# Patient Record
Sex: MALE | Race: BLACK OR AFRICAN AMERICAN | NOT HISPANIC OR LATINO
[De-identification: names, ages, dates, MRNs, and addresses within clinical notes are randomized per-mention and may not be internally consistent; named-entity substitution may affect disease eponyms.]

---

## 2022-09-21 PROBLEM — Z00.00 ENCOUNTER FOR PREVENTIVE HEALTH EXAMINATION: Status: ACTIVE | Noted: 2022-09-21

## 2022-09-24 ENCOUNTER — APPOINTMENT (OUTPATIENT)
Dept: ORTHOPEDIC SURGERY | Facility: CLINIC | Age: 62
End: 2022-09-24

## 2022-09-24 DIAGNOSIS — M17.11 UNILATERAL PRIMARY OSTEOARTHRITIS, RIGHT KNEE: ICD-10-CM

## 2022-09-24 DIAGNOSIS — M25.561 PAIN IN RIGHT KNEE: ICD-10-CM

## 2022-09-24 PROCEDURE — 20610 DRAIN/INJ JOINT/BURSA W/O US: CPT | Mod: RT

## 2022-09-24 PROCEDURE — 73564 X-RAY EXAM KNEE 4 OR MORE: CPT | Mod: RT

## 2022-09-24 PROCEDURE — 99203 OFFICE O/P NEW LOW 30 MIN: CPT | Mod: 25

## 2022-09-24 RX ORDER — DAPAGLIFLOZIN 10 MG/1
10 TABLET, FILM COATED ORAL
Refills: 0 | Status: ACTIVE | COMMUNITY

## 2022-09-24 RX ORDER — AMLODIPINE BESYLATE 10 MG/1
10 TABLET ORAL
Refills: 0 | Status: ACTIVE | COMMUNITY

## 2022-09-24 RX ORDER — LOSARTAN POTASSIUM 100 MG/1
100 TABLET, FILM COATED ORAL
Refills: 0 | Status: ACTIVE | COMMUNITY

## 2022-09-24 RX ORDER — APIXABAN 5 MG/1
TABLET, FILM COATED ORAL
Refills: 0 | Status: ACTIVE | COMMUNITY

## 2022-09-24 RX ORDER — CHLORTHALIDONE 50 MG/1
TABLET ORAL
Refills: 0 | Status: ACTIVE | COMMUNITY

## 2022-09-24 RX ORDER — CALCITRIOL 0.25 UG/1
0.25 CAPSULE, LIQUID FILLED ORAL
Refills: 0 | Status: ACTIVE | COMMUNITY

## 2022-09-24 RX ORDER — COLCHICINE 0.6 MG/1
CAPSULE ORAL
Refills: 0 | Status: ACTIVE | COMMUNITY

## 2022-09-24 RX ORDER — DICLOFENAC SODIUM 1% 10 MG/G
1 GEL TOPICAL
Qty: 1 | Refills: 0 | Status: ACTIVE | COMMUNITY
Start: 2022-09-24 | End: 1900-01-01

## 2022-09-24 RX ORDER — LABETALOL HYDROCHLORIDE 300 MG/1
TABLET, FILM COATED ORAL
Refills: 0 | Status: ACTIVE | COMMUNITY

## 2022-09-24 RX ORDER — ALLOPURINOL 200 MG/1
TABLET ORAL
Refills: 0 | Status: ACTIVE | COMMUNITY

## 2022-09-24 NOTE — HISTORY OF PRESENT ILLNESS
[de-identified] : Patient presents today for initial evaluation of right knee pain.  The patient reports of having pain for the better part of the past 10 months.  The pain started last November during a birthday party during which she was dancing.  At that time he felt a snapping sensation.  He reports of intermittent pain of the anterior knee.  He does have pain after sitting for an extended period time and going to stand.  He reports of mild buckling at times.  He is unable to take anti-inflammatories because of past gastric bypass surgery.  He does occasionally take Tylenol.  He does not use any bracing.  No cane or walker.  No past knee surgery.\par \par Review of Systems-\par Constitutional: No fever or chills. \par Cardiovascular: No orthopnea or chest pain\par Pulmonary: No shortness of breath. \par GI: No nausea or vomiting or abdominal pain.\par Musculoskeletal: see HPI \par Psychiatric: No anxiety and depression.

## 2022-09-24 NOTE — PHYSICAL EXAM
[de-identified] : The patient is conversive and in no apparent distress. The patient is alert and oriented to person, place, and time. Affect and mood appear normal. The head is normocephalic and atraumatic. Skin shows normal turgor with no evidence of eczema or psoriasis. No respiratory distress noted. Sensation grossly intact. MUSCULOSKELETAL:   SEE BELOW\par \par Right knee exam demonstrates skin that is clean, dry and intact.  No surgical scars.  No signs of acute trauma.  Minimal effusion.  Normal temperature.  Normal alignment of the knee.  Passive range of motion is 0 to 105 degrees of flexion which is limited by body habitus.  Minimal laxity with stress testing.  No extensor mechanism lag.  There is specific medial joint line tenderness.  No lateral knee tenderness.  Calf is nontender.  Chronic venous stasis changes and venous insufficiency changes are noted distally.  Varicose veins are appreciated.  No ulcerations.  No active cellulitis.  Sensation to light touch intact distally. [de-identified] : 4 view right knee x-rays were reviewed.  Moderate medial and patellofemoral joint space narrowing is appreciated.  Early osteophyte formation noted.  No retained hardware.

## 2022-09-24 NOTE — PROCEDURE
[de-identified] : Procedure: Injection of the right knee. \par Indication:  Osteoarthritis. \par Risk and benefits were discussed with the patient. Potential complications include bleeding and infection. Verbal consent was obtained prior to the procedure. \par Chloraprep was used to prep the area. ethyl chloride spray was used as a topical anesthetic. Using sterile technique, the aspiration/injection needle was then directed from a lateral aspect was used to inject 5 mL of 1% Lidocaine and 2 mL of 40mg/mL methylprednisolone. A bandage was applied. The patient tolerated the procedure well. \par Complications: none. \par Patient instructed to avoid strenuous activity for 1 day(s). \par Follow-up in the office as needed.

## 2022-09-24 NOTE — DISCUSSION/SUMMARY
[de-identified] : 30 minutes was spent reviewing the x-rays as well as discussing with the patient their clinical presentation, diagnosis and providing education.  The patient was informed of the moderate degenerative changes of the right knee.  The patient was informed of the pain associated with osteoarthritis.  Conservative treatment options were presented to the patient.  The patient was offered and consented to a corticosteroid injection today.  He is also recommended diclofenac gel.  A prescription is provided.  Education was provided.  He will continue to use acetaminophen.  He will avoid anti-inflammatories because of past gastric surgery.  He is recommended weight loss.  He will be seen back in approximately 6 weeks time for reevaluation.  He is headed on a  cruise next week.  He is recommended to obtain a knee sleeve to help with stability purposes.  All questions were answered to the patient satisfaction.

## 2022-09-27 PROBLEM — M25.561 RIGHT KNEE PAIN, UNSPECIFIED CHRONICITY: Status: ACTIVE | Noted: 2022-09-24

## 2022-11-14 ENCOUNTER — TRANSCRIPTION ENCOUNTER (OUTPATIENT)
Age: 62
End: 2022-11-14

## 2022-11-16 ENCOUNTER — APPOINTMENT (OUTPATIENT)
Dept: ORTHOPEDIC SURGERY | Facility: CLINIC | Age: 62
End: 2022-11-16

## 2022-11-16 VITALS
HEART RATE: 67 BPM | WEIGHT: 297 LBS | HEIGHT: 72 IN | SYSTOLIC BLOOD PRESSURE: 154 MMHG | DIASTOLIC BLOOD PRESSURE: 107 MMHG | BODY MASS INDEX: 40.23 KG/M2

## 2022-11-16 DIAGNOSIS — M25.562 PAIN IN LEFT KNEE: ICD-10-CM

## 2022-11-16 PROCEDURE — 20610 DRAIN/INJ JOINT/BURSA W/O US: CPT | Mod: LT

## 2022-11-16 PROCEDURE — 99214 OFFICE O/P EST MOD 30 MIN: CPT | Mod: 25

## 2022-11-16 PROCEDURE — 73564 X-RAY EXAM KNEE 4 OR MORE: CPT | Mod: LT

## 2022-11-16 NOTE — DISCUSSION/SUMMARY
[Medication Risks Reviewed] : Medication risks reviewed [Surgical risks reviewed] : Surgical risks reviewed [de-identified] : Patient is a 61-year-old male with moderate right knee osteoarthritis and severe left knee osteoarthritis presenting today for initial evaluation.  He has had good response to cortisone junctions right knee by another provider.  I recommend he continue with his physical therapy and at home exercises.  He is continue take Tylenol as needed for the pain.  I will see him back on an as-needed basis for his right knee.  As for his left knee he does have significant and severe lateral compartment arthritis on his x-ray.  He is not a try conservative treatment I think that is warranted at this time.  I given injection of cortisone which he tolerated well.  I recommended physical therapy.  He should take Tylenol as needed for the pain as he is only take NSAIDs due to his Eliquis use.  I will see him back in 2 months for repeat evaluation.  All questions were asked and answered.  He was advised he may be a candidate for total knee arthroplasty in the future.

## 2022-11-16 NOTE — HISTORY OF PRESENT ILLNESS
[Worsening] : worsening [Standing] : standing [Daily] : ~He/She~ states the symptoms seem to be occuring daily [Sitting] : worsened by sitting [Walking] : worsened by walking [Knee Flexion] : worsened with knee flexion [Acetaminophen] : relieved by acetaminophen [Rest] : relieved by rest [de-identified] : 61-year-old male past medical history of hypertension, DVT on Eliquis, chronic kidney disease and past surgical history of gastric bypass surgery presents to the office for initial evaluation of left knee pain and follow-up of right knee pain from the after-hours clinic.  The patient received a cortisone injection for his right knee pain in September which she states has provided good relief.  He is now complaining of left knee pain he states that he has had it for the past few months but since his right knee is feeling better he has noticed it much more.  It is located in the anterior aspect of his knee and exacerbated by walking, sitting for long periods of time, rising from a seated position, standing.  Denies use of assistive device ambulation.  Takes Tylenol for the pain as he is unable to take NSAIDs due to to his use of Eliquis as well as history of gastric bypass surgery.  He has not been to physical therapy for his knees and denies any surgeries in the knees. Admits to swelling in bilateral calves and ankles. Denies any recent injuries or falls, locking catching or buckling of the knees, swelling in the knees, numbness or tingling in the lower extremities, radiation of pain to the hip or back.\par \par Denies history of smoking drug or alcohol use. [Direct Pressure] : not worsened by direct pressure

## 2022-11-16 NOTE — PROCEDURE
[de-identified] : I injected the Left Knee.\par I discussed at length with the patient the planned steroid and lidocaine injection. The risks, benefits, convalescence and alternatives were reviewed. The possible side effects discussed included but were not limited to: pain, swelling, heat, bleeding, and redness. Symptoms are generally mild but if they are extensive then contact the office. Giving pain relievers by mouth such as NSAIDs or Tylenol can generally treat the reactions to steroid and lidocaine. Rare cases of infection have been noted. Rash, hives and itching may occur post injection. If you have muscle pain or cramps, flushing and or swelling of the face, rapid heart beat, nausea, dizziness, fever, chills, headache, difficulty breathing, swelling in the arms or legs, or have a prickly feeling of your skin, contact a health care provider immediately. Following this discussion, the knee was prepped with Alcohol and under sterile condition the 80 mg Depo-Medrol and 6 cc Lidocaine injection was performed with a 20 gauge needle through a superolateral injection site. The needle was introduced into the joint, aspiration was performed to ensure intra-articular placement and the medication was injected. Upon withdrawal of the needle the site was cleaned with alcohol and a band aid applied. The patient tolerated the injection well and there were no adverse effects. Post injection instructions included no strenuous activity for 24 hours, cryotherapy and if there are any adverse effects to contact the office.

## 2022-11-16 NOTE — PHYSICAL EXAM
[de-identified] : GENERAL APPEARANCE: Well nourished and hydrated, pleasant, alert, and oriented x 3. Appears their stated age. \par HEENT: Normocephalic, extraocular eye motion intact. Nasal septum midline. Oral cavity clear. External auditory canal clear. \par RESPIRATORY: Breath sounds clear and audible in all lobes. No wheezing, No accessory muscle use.\par CARDIOVASCULAR: No apparent abnormalities. No lower leg edema. No varicosities. Pedal pulses are palpable.\par NEUROLOGIC: Sensation is normal, no muscle weakness in the upper or lower extremities.\par DERMATOLOGIC: No apparent skin lesions, moist, warm, no rash.\par SPINE: Cervical spine appears normal and moves freely; thoracic spine appears normal and moves freely; lumbosacral spine appears normal and moves freely, normal, nontender.\par MUSCULOSKELETAL: Hands, wrists, and elbows are normal and move freely, shoulders are normal and move freely. \par Psychiatric: Oriented to person, place, and time, insight and judgement were intact and the affect was normal. \par Musculoskeletal:. Left knee exam shows mild effusion, ROM is 0-1 15 degrees, no instability, no pain with Patricia, lateral joint line tenderness. \par Right knee exam shows no effusion, ROM is 0-1 20 degrees, no instability, no pain with Patricia, and no joint line tenderness. \par 5/5 motor strength in bilateral lower extremities. Sensory: Intact in bilateral lower extremities. DTRs: Biceps, brachioradialis, triceps, patellar, ankle and plantar 2+ and symmetric bilaterally. Pulses: dorsalis pedis, posterior tibial, femoral, popliteal, and radial 2+ and symmetric bilaterally. \par  [de-identified] : Left knee obtained the office today show no acute fracture or dislocation.  There is severe lateral joint space narrowing bone-on-bone osteoarthritis tricompartment degenerative changes consistent with Kellgren-Manolo grade 4 changes.

## 2023-02-15 ENCOUNTER — APPOINTMENT (OUTPATIENT)
Dept: ORTHOPEDIC SURGERY | Facility: CLINIC | Age: 63
End: 2023-02-15

## 2023-05-30 ENCOUNTER — APPOINTMENT (OUTPATIENT)
Dept: ORTHOPEDIC SURGERY | Facility: CLINIC | Age: 63
End: 2023-05-30

## 2023-05-30 ENCOUNTER — APPOINTMENT (OUTPATIENT)
Dept: ORTHOPEDIC SURGERY | Facility: CLINIC | Age: 63
End: 2023-05-30
Payer: COMMERCIAL

## 2023-05-30 VITALS — HEIGHT: 72 IN | BODY MASS INDEX: 39.28 KG/M2 | WEIGHT: 290 LBS

## 2023-05-30 DIAGNOSIS — M79.643 PAIN IN UNSPECIFIED HAND: ICD-10-CM

## 2023-05-30 DIAGNOSIS — I10 ESSENTIAL (PRIMARY) HYPERTENSION: ICD-10-CM

## 2023-05-30 PROCEDURE — 99203 OFFICE O/P NEW LOW 30 MIN: CPT

## 2023-05-30 NOTE — ASSESSMENT
[FreeTextEntry1] : Right index MCP and middle finger PIP joint sprain - reviewed radiographs and pathoanatomy with patient. Discussed management to focus on OT, NSAIDs prn, ROM encouraged.\par \par F/u prn

## 2023-05-30 NOTE — IMAGING
[de-identified] : Right index finger with +ttp at MCP (equal motion under stress to left side), +ttp at middle finger PIP joint. Full arc of flexion, extension. Sensation intact throughout. <2sec cap refill.\par \par Right index finger radiographs with no fracture nor dislocation.

## 2023-05-30 NOTE — HISTORY OF PRESENT ILLNESS
[de-identified] : 63 y/o RHD Male,  present with right hand pain since february 2022. Patient slipped and fell backward on a slippery floor. Patient did go to [facility] ON 4/14/23. Patient is not bracing. Patient has no numbness/ tingling. Patient is retired.

## 2023-08-14 ENCOUNTER — NON-APPOINTMENT (OUTPATIENT)
Age: 63
End: 2023-08-14

## 2023-08-15 ENCOUNTER — NON-APPOINTMENT (OUTPATIENT)
Age: 63
End: 2023-08-15

## 2023-08-15 ENCOUNTER — APPOINTMENT (OUTPATIENT)
Dept: OPHTHALMOLOGY | Facility: CLINIC | Age: 63
End: 2023-08-15
Payer: MEDICARE

## 2023-08-15 PROCEDURE — 92134 CPTRZ OPH DX IMG PST SGM RTA: CPT

## 2023-08-15 PROCEDURE — 92004 COMPRE OPH EXAM NEW PT 1/>: CPT

## 2024-02-15 ENCOUNTER — APPOINTMENT (OUTPATIENT)
Dept: OPHTHALMOLOGY | Facility: CLINIC | Age: 64
End: 2024-02-15

## 2024-04-10 ENCOUNTER — APPOINTMENT (OUTPATIENT)
Dept: ORTHOPEDIC SURGERY | Facility: CLINIC | Age: 64
End: 2024-04-10
Payer: MEDICARE

## 2024-04-10 VITALS — WEIGHT: 269 LBS | BODY MASS INDEX: 36.44 KG/M2 | HEIGHT: 72 IN

## 2024-04-10 VITALS — DIASTOLIC BLOOD PRESSURE: 91 MMHG | HEART RATE: 68 BPM | SYSTOLIC BLOOD PRESSURE: 128 MMHG

## 2024-04-10 DIAGNOSIS — M17.0 BILATERAL PRIMARY OSTEOARTHRITIS OF KNEE: ICD-10-CM

## 2024-04-10 PROCEDURE — 20610 DRAIN/INJ JOINT/BURSA W/O US: CPT | Mod: 50

## 2024-04-10 PROCEDURE — 99214 OFFICE O/P EST MOD 30 MIN: CPT | Mod: 25

## 2024-04-10 PROCEDURE — 73564 X-RAY EXAM KNEE 4 OR MORE: CPT | Mod: 50

## 2024-04-10 RX ORDER — DICLOFENAC SODIUM 1% 10 MG/G
1 GEL TOPICAL
Qty: 100 | Refills: 0 | Status: ACTIVE | COMMUNITY
Start: 2024-04-10 | End: 1900-01-01

## 2024-04-10 NOTE — PROCEDURE
[de-identified] : I injected the left knee. I discussed at length with the patient the planned steroid and lidocaine injection. The risks, benefits, convalescence and alternatives were reviewed. The possible side effects discussed included but were not limited to: pain, swelling, heat, bleeding, and redness. Symptoms are generally mild but if they are extensive then contact the office. Giving pain relievers by mouth such as NSAIDs or Tylenol can generally treat the reactions to steroid and lidocaine. Rare cases of infection have been noted. Rash, hives and itching may occur post injection. If you have muscle pain or cramps, flushing and or swelling of the face, rapid heart beat, nausea, dizziness, fever, chills, headache, difficulty breathing, swelling in the arms or legs, or have a prickly feeling of your skin, contact a health care provider immediately. Following this discussion, the knee was prepped with Alcohol and under sterile condition the 80 mg Depo-Medrol and 6 cc Lidocaine injection was performed with a 20 gauge needle through a superolateral injection site. The needle was introduced into the joint, aspiration was performed to ensure intra-articular placement and the medication was injected. Upon withdrawal of the needle the site was cleaned with alcohol and a band aid applied. The patient tolerated the injection well and there were no adverse effects. Post injection instructions included no strenuous activity for 24 hours, cryotherapy and if there are any adverse effects to contact the office.  I injected the right knee. I discussed at length with the patient the planned steroid and lidocaine injection. The risks, benefits, convalescence and alternatives were reviewed. The possible side effects discussed included but were not limited to: pain, swelling, heat, bleeding, and redness. Symptoms are generally mild but if they are extensive then contact the office. Giving pain relievers by mouth such as NSAIDs or Tylenol can generally treat the reactions to steroid and lidocaine. Rare cases of infection have been noted. Rash, hives and itching may occur post injection. If you have muscle pain or cramps, flushing and or swelling of the face, rapid heart beat, nausea, dizziness, fever, chills, headache, difficulty breathing, swelling in the arms or legs, or have a prickly feeling of your skin, contact a health care provider immediately. Following this discussion, the knee was prepped with Alcohol and under sterile condition the 80 mg Depo-Medrol and 6 cc Lidocaine injection was performed with a 20 gauge needle through a superolateral injection site. The needle was introduced into the joint, aspiration was performed to ensure intra-articular placement and the medication was injected. Upon withdrawal of the needle the site was cleaned with alcohol and a band aid applied. The patient tolerated the injection well and there were no adverse effects. Post injection instructions included no strenuous activity for 24 hours, cryotherapy and if there are any adverse effects to contact the office.

## 2024-04-10 NOTE — DISCUSSION/SUMMARY
[Medication Risks Reviewed] : Medication risks reviewed [Surgical risks reviewed] : Surgical risks reviewed [de-identified] : 63-year-old male presents to the office for follow-up of his severe bone-on-bone lateral comparment left knee osteoarthritis and moderate right knee osteoarthritis.  We had a long discussion regarding surgical versus conservative treatment options.  Due to the fact that the patient has had good result with conservative therapies he would like to continue with that today.  I gave him an injection of cortisone to bilateral knees which she tolerated well.  I have sent a prescription of Voltaren gel to the pharmacy on file as the patient is unable to take NSAIDs due to use of anticoagulation as well as a history of gastric bypass surgery.  I recommend the patient take Tylenol as needed for any pain.  I have given him a referral for physical therapy, and we discussed low impact activity exercise.  Follow-up in 3 months for repeat evaluation.  All questions were addressed, the patient verbalized understanding and is in agreement with the plan.

## 2024-04-10 NOTE — HISTORY OF PRESENT ILLNESS
[Worsening] : worsening [Standing] : standing [Daily] : ~He/She~ states the symptoms seem to be occuring daily [Sitting] : worsened by sitting [Walking] : worsened by walking [Acetaminophen] : relieved by acetaminophen [de-identified] : 63-year-old male presents to the office for follow-up of bilateral knee pain.  Patient states that he received bilateral cortisone injections in 2022 and had good resolution of his symptoms up until a few weeks ago.  He states the pain has begun to return and is located over diffusely over bilateral knees.  He states that he takes Tylenol for the pain and is unable to take NSAIDs due to the use of anticoagulation as well as a history of gastric bypass surgery.  Ambulates without the use of assistive device.  Has not been to physical therapy.  Denies any recent injuries falls or trauma, locking catching or buckling of the knees, numbness or tingling in the lower extremities, significant changes to his medical history since his last visit.

## 2024-04-10 NOTE — PHYSICAL EXAM
[de-identified] : Left knee exam shows mild effusion, ROM is 0-115 degrees, no instability, no pain with Patricia, lateral joint line tenderness. Right knee exam shows no effusion, ROM is 0-120 degrees, no instability, no pain with Patricia, and no joint line tenderness. 5/5 motor strength in bilateral lower extremities. Sensory: Intact in bilateral lower extremities. DTRs: Biceps, brachioradialis, triceps, patellar, ankle and plantar 2+ and symmetric bilaterally. Pulses: dorsalis pedis, posterior tibial, femoral, popliteal, and radial 2+ and symmetric bilaterally. [de-identified] : 4 views of the bilateral knees taken in office today show no acute fracture dislocations there is severe lateral joint space narrowing bone-on-bone osteoarthritis, tricompartmental degenerative changes in the left knee consistent with Kellgren-Manolo grade 4 changes, as for the right knee, there is medial joint space narrowing and patellofemoral joint space  narrowing consistent with Kellgren-Manolo grade 2 - 3changes.

## 2024-04-10 NOTE — REVIEW OF SYSTEMS
[Joint Pain] : joint pain [Joint Stiffness] : joint stiffness [Joint Swelling] : joint swelling [FreeTextEntry9] : b/l knee pain

## 2024-07-10 ENCOUNTER — OFFICE (OUTPATIENT)
Dept: URBAN - METROPOLITAN AREA CLINIC 1 | Facility: CLINIC | Age: 64
Setting detail: OPHTHALMOLOGY
End: 2024-07-10
Payer: MEDICARE

## 2024-07-10 DIAGNOSIS — H44.22: ICD-10-CM

## 2024-07-10 DIAGNOSIS — H25.13: ICD-10-CM

## 2024-07-10 DIAGNOSIS — H35.40: ICD-10-CM

## 2024-07-10 DIAGNOSIS — H35.413: ICD-10-CM

## 2024-07-10 DIAGNOSIS — H53.002: ICD-10-CM

## 2024-07-10 PROCEDURE — 92250 FUNDUS PHOTOGRAPHY W/I&R: CPT | Performed by: OPHTHALMOLOGY

## 2024-07-10 PROCEDURE — 99204 OFFICE O/P NEW MOD 45 MIN: CPT | Performed by: OPHTHALMOLOGY

## 2024-07-11 ENCOUNTER — OFFICE (OUTPATIENT)
Dept: URBAN - METROPOLITAN AREA CLINIC 115 | Facility: CLINIC | Age: 64
Setting detail: OPHTHALMOLOGY
End: 2024-07-11
Payer: MEDICARE

## 2024-07-11 DIAGNOSIS — H44.22: ICD-10-CM

## 2024-07-11 DIAGNOSIS — H35.40: ICD-10-CM

## 2024-07-11 DIAGNOSIS — H35.413: ICD-10-CM

## 2024-07-11 DIAGNOSIS — H25.13: ICD-10-CM

## 2024-07-11 PROBLEM — H53.002 AMBLYOPIA; LEFT EYE: Status: ACTIVE | Noted: 2024-07-10

## 2024-07-11 PROCEDURE — 92134 CPTRZ OPH DX IMG PST SGM RTA: CPT | Performed by: OPHTHALMOLOGY

## 2024-07-11 PROCEDURE — 92014 COMPRE OPH EXAM EST PT 1/>: CPT | Performed by: OPHTHALMOLOGY

## 2024-07-11 ASSESSMENT — CONFRONTATIONAL VISUAL FIELD TEST (CVF)
OS_FINDINGS: FULL
OD_FINDINGS: FULL

## 2024-10-24 ENCOUNTER — OFFICE (OUTPATIENT)
Dept: URBAN - METROPOLITAN AREA CLINIC 1 | Facility: CLINIC | Age: 64
Setting detail: OPHTHALMOLOGY
End: 2024-10-24
Payer: COMMERCIAL

## 2024-10-24 DIAGNOSIS — H25.12: ICD-10-CM

## 2024-10-24 DIAGNOSIS — H25.13: ICD-10-CM

## 2024-10-24 PROCEDURE — 99213 OFFICE O/P EST LOW 20 MIN: CPT | Performed by: OPHTHALMOLOGY

## 2024-10-24 PROCEDURE — 92136 OPHTHALMIC BIOMETRY: CPT | Performed by: OPHTHALMOLOGY

## 2024-10-24 ASSESSMENT — VISUAL ACUITY
OD_BCVA: CF 1FT
OS_BCVA: 20/100

## 2024-10-24 ASSESSMENT — KERATOMETRY
OS_K2POWER_DIOPTERS: 44.25
OS_K1POWER_DIOPTERS: 43.25
OD_K1POWER_DIOPTERS: 42.00
OD_AXISANGLE_DEGREES: 097
OS_AXISANGLE_DEGREES: 075
OD_K2POWER_DIOPTERS: 44.50

## 2024-10-24 ASSESSMENT — REFRACTION_CURRENTRX
OS_SPHERE: -14.00
OS_VPRISM_DIRECTION: SV
OD_OVR_VA: 20/
OS_CYLINDER: -3.00
OD_CYLINDER: -3.50
OD_AXIS: 007
OS_OVR_VA: 20/
OD_SPHERE: -14.00
OD_VPRISM_DIRECTION: SV
OS_AXIS: 012

## 2024-10-24 ASSESSMENT — REFRACTION_AUTOREFRACTION
OD_SPHERE: -23.00
OS_CYLINDER: 0.00
OS_AXIS: 000
OD_AXIS: 017
OD_CYLINDER: -4.00
OS_SPHERE: 0.00

## 2024-10-24 ASSESSMENT — CONFRONTATIONAL VISUAL FIELD TEST (CVF)
OS_FINDINGS: FULL
OD_FINDINGS: FULL

## 2024-10-24 ASSESSMENT — TONOMETRY
OD_IOP_MMHG: 12
OS_IOP_MMHG: 15

## 2024-11-07 ENCOUNTER — ASC (OUTPATIENT)
Dept: URBAN - METROPOLITAN AREA SURGERY 8 | Facility: SURGERY | Age: 64
Setting detail: OPHTHALMOLOGY
End: 2024-11-07
Payer: COMMERCIAL

## 2024-11-07 DIAGNOSIS — H52.212: ICD-10-CM

## 2024-11-07 DIAGNOSIS — H25.12: ICD-10-CM

## 2024-11-07 PROCEDURE — 66984 XCAPSL CTRC RMVL W/O ECP: CPT | Mod: LT | Performed by: OPHTHALMOLOGY

## 2024-11-07 PROCEDURE — FEMTO FEMTOSECOND LASER: Mod: GY | Performed by: OPHTHALMOLOGY

## 2024-11-08 ENCOUNTER — RX ONLY (RX ONLY)
Age: 64
End: 2024-11-08

## 2024-11-08 ENCOUNTER — OFFICE (OUTPATIENT)
Dept: URBAN - METROPOLITAN AREA CLINIC 1 | Facility: CLINIC | Age: 64
Setting detail: OPHTHALMOLOGY
End: 2024-11-08
Payer: COMMERCIAL

## 2024-11-08 DIAGNOSIS — Z96.1: ICD-10-CM

## 2024-11-08 PROCEDURE — 99024 POSTOP FOLLOW-UP VISIT: CPT

## 2024-11-08 ASSESSMENT — REFRACTION_CURRENTRX
OS_VPRISM_DIRECTION: SV
OD_SPHERE: -14.00
OD_VPRISM_DIRECTION: SV
OS_AXIS: 012
OD_CYLINDER: -3.50
OS_SPHERE: -14.00
OD_OVR_VA: 20/
OS_CYLINDER: -3.00
OS_OVR_VA: 20/
OD_AXIS: 007

## 2024-11-08 ASSESSMENT — KERATOMETRY
OD_K2POWER_DIOPTERS: 44.25
OS_K1POWER_DIOPTERS: 43.00
OS_AXISANGLE_DEGREES: 090
OD_K1POWER_DIOPTERS: 42.25
OS_K2POWER_DIOPTERS: 45.50
OD_AXISANGLE_DEGREES: 096

## 2024-11-08 ASSESSMENT — CONFRONTATIONAL VISUAL FIELD TEST (CVF)
OS_FINDINGS: FULL
OD_FINDINGS: FULL

## 2024-11-08 ASSESSMENT — REFRACTION_AUTOREFRACTION
OD_AXIS: 017
OS_SPHERE: -5.50
OD_SPHERE: -21.50
OS_CYLINDER: -1.00
OS_AXIS: 179
OD_CYLINDER: -3.50

## 2024-11-08 ASSESSMENT — VISUAL ACUITY
OS_BCVA: 20/100
OD_BCVA: CF

## 2024-11-09 ENCOUNTER — OFFICE (OUTPATIENT)
Dept: URBAN - METROPOLITAN AREA CLINIC 1 | Facility: CLINIC | Age: 64
Setting detail: OPHTHALMOLOGY
End: 2024-11-09
Payer: COMMERCIAL

## 2024-11-09 ENCOUNTER — RX ONLY (RX ONLY)
Age: 64
End: 2024-11-09

## 2024-11-09 DIAGNOSIS — H25.11: ICD-10-CM

## 2024-11-09 PROCEDURE — 92136 OPHTHALMIC BIOMETRY: CPT | Performed by: OPHTHALMOLOGY

## 2024-11-09 ASSESSMENT — REFRACTION_MANIFEST
OS_VA1: 20/60
OS_AXIS: 160
OS_SPHERE: -7.75
OS_CYLINDER: -0.75

## 2024-11-09 ASSESSMENT — REFRACTION_CURRENTRX
OS_AXIS: 012
OD_AXIS: 007
OD_VPRISM_DIRECTION: SV
OS_VPRISM_DIRECTION: SV
OS_SPHERE: -14.00
OS_OVR_VA: 20/
OD_OVR_VA: 20/
OS_CYLINDER: -3.00
OD_CYLINDER: -3.50
OD_SPHERE: -14.00

## 2024-11-09 ASSESSMENT — KERATOMETRY
OS_K2POWER_DIOPTERS: 44.50
OS_AXISANGLE_DEGREES: 076
OD_K2POWER_DIOPTERS: 44.50
OD_AXISANGLE_DEGREES: 097
OS_K1POWER_DIOPTERS: 43.25
OD_K1POWER_DIOPTERS: 42.00

## 2024-11-09 ASSESSMENT — TONOMETRY
OD_IOP_MMHG: 17
OS_IOP_MMHG: 20
OS_IOP_MMHG: 16

## 2024-11-09 ASSESSMENT — VISUAL ACUITY
OD_BCVA: 20/60
OS_BCVA: 20/60-2

## 2024-11-09 ASSESSMENT — REFRACTION_AUTOREFRACTION
OD_AXIS: 017
OS_AXIS: 157
OS_CYLINDER: -0.75
OS_SPHERE: -7.75
OD_CYLINDER: -2.25
OD_SPHERE: -22.00

## 2024-11-09 ASSESSMENT — CONFRONTATIONAL VISUAL FIELD TEST (CVF)
OS_FINDINGS: FULL
OD_FINDINGS: FULL

## 2024-11-18 ENCOUNTER — ASC (OUTPATIENT)
Dept: URBAN - METROPOLITAN AREA SURGERY 8 | Facility: SURGERY | Age: 64
Setting detail: OPHTHALMOLOGY
End: 2024-11-18
Payer: COMMERCIAL

## 2024-11-18 DIAGNOSIS — H25.89: ICD-10-CM

## 2024-11-18 DIAGNOSIS — H52.211: ICD-10-CM

## 2024-11-18 DIAGNOSIS — H25.11: ICD-10-CM

## 2024-11-18 PROCEDURE — 66982 XCAPSL CTRC RMVL CPLX WO ECP: CPT | Mod: 79,RT | Performed by: OPHTHALMOLOGY

## 2024-11-18 PROCEDURE — FEMTO FEMTOSECOND LASER: Mod: GY,RT | Performed by: OPHTHALMOLOGY

## 2024-11-19 ENCOUNTER — OFFICE (OUTPATIENT)
Dept: URBAN - METROPOLITAN AREA CLINIC 1 | Facility: CLINIC | Age: 64
Setting detail: OPHTHALMOLOGY
End: 2024-11-19
Payer: COMMERCIAL

## 2024-11-19 DIAGNOSIS — Z96.1: ICD-10-CM

## 2024-11-19 PROCEDURE — 99024 POSTOP FOLLOW-UP VISIT: CPT

## 2024-11-19 ASSESSMENT — CORNEAL EDEMA - MICROCYSTIC EPITHELIAL EDEMA (MCE): OD_MCE: 1+

## 2024-11-19 ASSESSMENT — REFRACTION_CURRENTRX
OD_CYLINDER: -3.50
OS_OVR_VA: 20/
OS_SPHERE: -14.00
OD_SPHERE: -14.00
OD_OVR_VA: 20/
OS_AXIS: 012
OD_AXIS: 007
OD_VPRISM_DIRECTION: SV
OS_CYLINDER: -3.00
OS_VPRISM_DIRECTION: SV

## 2024-11-19 ASSESSMENT — CORNEAL EDEMA CLINICAL DESCRIPTION: OD_CORNEALEDEMA: 1+

## 2024-11-19 ASSESSMENT — VISUAL ACUITY
OD_BCVA: 20/400
OS_BCVA: 20/100

## 2024-11-19 ASSESSMENT — REFRACTION_MANIFEST
OS_CYLINDER: -0.75
OS_SPHERE: -7.75
OS_VA1: 20/60
OS_AXIS: 160

## 2024-11-19 ASSESSMENT — CONFRONTATIONAL VISUAL FIELD TEST (CVF)
OD_FINDINGS: FULL
OS_FINDINGS: FULL

## 2024-11-19 ASSESSMENT — REFRACTION_AUTOREFRACTION
OS_CYLINDER: -1.25
OD_CYLINDER: -0.25
OS_AXIS: 10
OD_AXIS: 105
OD_SPHERE: +3.50
OS_SPHERE: -7.75

## 2024-11-19 ASSESSMENT — KERATOMETRY
OS_K1POWER_DIOPTERS: 43.25
OS_AXISANGLE_DEGREES: 085
OS_K2POWER_DIOPTERS: 44.75

## 2024-11-20 ENCOUNTER — OFFICE (OUTPATIENT)
Dept: URBAN - METROPOLITAN AREA CLINIC 1 | Facility: CLINIC | Age: 64
Setting detail: OPHTHALMOLOGY
End: 2024-11-20
Payer: COMMERCIAL

## 2024-11-20 ENCOUNTER — RX ONLY (RX ONLY)
Age: 64
End: 2024-11-20

## 2024-11-20 DIAGNOSIS — Z96.1: ICD-10-CM

## 2024-11-20 PROCEDURE — 99024 POSTOP FOLLOW-UP VISIT: CPT

## 2024-11-20 ASSESSMENT — REFRACTION_CURRENTRX
OD_AXIS: 007
OS_VPRISM_DIRECTION: SV
OS_SPHERE: -14.00
OD_SPHERE: -14.00
OS_CYLINDER: -3.00
OD_OVR_VA: 20/
OD_CYLINDER: -3.50
OD_VPRISM_DIRECTION: SV
OS_OVR_VA: 20/
OS_AXIS: 012

## 2024-11-20 ASSESSMENT — REFRACTION_AUTOREFRACTION
OD_AXIS: 143
OS_SPHERE: -8.25
OS_AXIS: 050
OD_SPHERE: -7.00
OS_CYLINDER: -0.50
OD_CYLINDER: -0.50

## 2024-11-20 ASSESSMENT — TONOMETRY
OS_IOP_MMHG: 18
OD_IOP_MMHG: 19

## 2024-11-20 ASSESSMENT — CORNEAL EDEMA CLINICAL DESCRIPTION: OD_CORNEALEDEMA: T

## 2024-11-20 ASSESSMENT — REFRACTION_MANIFEST
OS_AXIS: 160
OS_VA1: 20/60
OS_CYLINDER: -0.75
OS_SPHERE: -7.75

## 2024-11-20 ASSESSMENT — KERATOMETRY
OS_K1POWER_DIOPTERS: 42.75
OD_K1POWER_DIOPTERS: 43.50
OD_K2POWER_DIOPTERS: 44.50
OD_AXISANGLE_DEGREES: 094
OS_AXISANGLE_DEGREES: 078
OS_K2POWER_DIOPTERS: 44.25

## 2024-11-20 ASSESSMENT — VISUAL ACUITY
OD_BCVA: 20/500
OS_BCVA: 20/125

## 2024-11-26 ENCOUNTER — OFFICE (OUTPATIENT)
Dept: URBAN - METROPOLITAN AREA CLINIC 1 | Facility: CLINIC | Age: 64
Setting detail: OPHTHALMOLOGY
End: 2024-11-26
Payer: COMMERCIAL

## 2024-11-26 DIAGNOSIS — Z96.1: ICD-10-CM

## 2024-11-26 PROCEDURE — 99024 POSTOP FOLLOW-UP VISIT: CPT

## 2024-11-26 ASSESSMENT — REFRACTION_CURRENTRX
OS_VPRISM_DIRECTION: SV
OS_OVR_VA: 20/
OD_OVR_VA: 20/
OD_SPHERE: -14.00
OD_AXIS: 007
OS_AXIS: 012
OS_SPHERE: -14.00
OD_CYLINDER: -3.50
OS_CYLINDER: -3.00
OD_VPRISM_DIRECTION: SV

## 2024-11-26 ASSESSMENT — REFRACTION_MANIFEST
OS_CYLINDER: -0.75
OS_AXIS: 160
OS_SPHERE: -7.75
OS_VA1: 20/60

## 2024-11-26 ASSESSMENT — KERATOMETRY
OD_K1POWER_DIOPTERS: 42.25
OD_AXISANGLE_DEGREES: 097
OS_K2POWER_DIOPTERS: 44.75
OS_K1POWER_DIOPTERS: 44.00
OS_AXISANGLE_DEGREES: 068
OD_K2POWER_DIOPTERS: 44.25

## 2024-11-26 ASSESSMENT — REFRACTION_AUTOREFRACTION
OD_CYLINDER: -1.50
OD_SPHERE: -6.25
OS_CYLINDER: -0.75
OD_AXIS: 004
OS_AXIS: 178
OS_SPHERE: -8.25

## 2024-11-26 ASSESSMENT — TONOMETRY
OS_IOP_MMHG: 17
OD_IOP_MMHG: 17

## 2024-11-26 ASSESSMENT — CONFRONTATIONAL VISUAL FIELD TEST (CVF)
OD_FINDINGS: FULL
OS_FINDINGS: FULL

## 2024-11-26 ASSESSMENT — VISUAL ACUITY
OS_BCVA: 20/125
OD_BCVA: 20/CF@6FT

## 2024-12-18 ENCOUNTER — OFFICE (OUTPATIENT)
Dept: URBAN - METROPOLITAN AREA CLINIC 1 | Facility: CLINIC | Age: 64
Setting detail: OPHTHALMOLOGY
End: 2024-12-18
Payer: COMMERCIAL

## 2024-12-18 DIAGNOSIS — Z96.1: ICD-10-CM

## 2024-12-18 DIAGNOSIS — H35.372: ICD-10-CM

## 2024-12-18 PROCEDURE — 99024 POSTOP FOLLOW-UP VISIT: CPT

## 2024-12-18 ASSESSMENT — REFRACTION_CURRENTRX
OS_AXIS: 012
OS_SPHERE: -14.00
OD_OVR_VA: 20/
OS_OVR_VA: 20/
OD_SPHERE: -14.00
OD_CYLINDER: -3.50
OD_AXIS: 007
OD_VPRISM_DIRECTION: SV
OS_VPRISM_DIRECTION: SV
OS_CYLINDER: -3.00

## 2024-12-18 ASSESSMENT — TONOMETRY
OD_IOP_MMHG: 16
OS_IOP_MMHG: 17

## 2024-12-18 ASSESSMENT — KERATOMETRY
OD_K2POWER_DIOPTERS: 44.25
OD_K1POWER_DIOPTERS: 42.25
OS_K2POWER_DIOPTERS: 44.75
OS_K1POWER_DIOPTERS: 43.00
OD_AXISANGLE_DEGREES: 098
OS_AXISANGLE_DEGREES: 078

## 2024-12-18 ASSESSMENT — REFRACTION_MANIFEST
OD_VA1: 20/20
OS_AXIS: 60
OS_CYLINDER: -0.50
OD_SPHERE: -5.25
OD_AXIS: 10
OD_CYLINDER: -1.25
OD_ADD: +2.50
OS_ADD: +2.50
OS_SPHERE: -7.25
OS_VA1: 20/60

## 2024-12-18 ASSESSMENT — REFRACTION_AUTOREFRACTION
OS_SPHERE: -8.00
OD_SPHERE: -6.00
OS_AXIS: 058
OD_CYLINDER: -2.00
OS_CYLINDER: -0.50
OD_AXIS: 009

## 2024-12-18 ASSESSMENT — VISUAL ACUITY
OD_BCVA: 20/600
OS_BCVA: 20/125

## 2024-12-18 ASSESSMENT — CONFRONTATIONAL VISUAL FIELD TEST (CVF)
OD_FINDINGS: FULL
OS_FINDINGS: FULL

## 2024-12-31 ENCOUNTER — OFFICE (OUTPATIENT)
Dept: URBAN - METROPOLITAN AREA CLINIC 1 | Facility: CLINIC | Age: 64
Setting detail: OPHTHALMOLOGY
End: 2024-12-31
Payer: COMMERCIAL

## 2024-12-31 DIAGNOSIS — H00.021: ICD-10-CM

## 2024-12-31 DIAGNOSIS — L03.213: ICD-10-CM

## 2024-12-31 PROCEDURE — 99213 OFFICE O/P EST LOW 20 MIN: CPT | Mod: 24

## 2024-12-31 ASSESSMENT — REFRACTION_CURRENTRX
OD_AXIS: 007
OD_CYLINDER: -3.50
OS_AXIS: 012
OS_SPHERE: -14.00
OS_OVR_VA: 20/
OD_OVR_VA: 20/
OD_VPRISM_DIRECTION: SV
OS_VPRISM_DIRECTION: SV
OS_CYLINDER: -3.00
OD_SPHERE: -14.00

## 2024-12-31 ASSESSMENT — CONFRONTATIONAL VISUAL FIELD TEST (CVF)
OD_FINDINGS: FULL
OS_FINDINGS: FULL

## 2024-12-31 ASSESSMENT — TONOMETRY
OS_IOP_MMHG: 15
OD_IOP_MMHG: 13

## 2024-12-31 ASSESSMENT — REFRACTION_MANIFEST
OD_ADD: +2.50
OS_ADD: +2.50
OD_SPHERE: -5.25
OS_SPHERE: -7.25
OD_AXIS: 10
OS_AXIS: 60
OD_CYLINDER: -1.25
OS_VA1: 20/60
OS_CYLINDER: -0.50
OD_VA1: 20/20

## 2024-12-31 ASSESSMENT — VISUAL ACUITY
OD_BCVA: 20/500
OS_BCVA: 20/200

## 2025-01-09 ENCOUNTER — OFFICE (OUTPATIENT)
Dept: URBAN - METROPOLITAN AREA CLINIC 115 | Facility: CLINIC | Age: 65
Setting detail: OPHTHALMOLOGY
End: 2025-01-09
Payer: COMMERCIAL

## 2025-01-09 DIAGNOSIS — H44.22: ICD-10-CM

## 2025-01-09 DIAGNOSIS — H35.413: ICD-10-CM

## 2025-01-09 DIAGNOSIS — H35.372: ICD-10-CM

## 2025-01-09 DIAGNOSIS — H35.40: ICD-10-CM

## 2025-01-09 PROBLEM — L03.213 PRESEPTAL CELLULITIS: Status: ACTIVE | Noted: 2024-12-31

## 2025-01-09 PROBLEM — H00.021 HORDEOLUM INTERNUM; RIGHT UPPER LID: Status: ACTIVE | Noted: 2024-12-31

## 2025-01-09 PROCEDURE — 92012 INTRM OPH EXAM EST PATIENT: CPT | Performed by: OPHTHALMOLOGY

## 2025-01-09 PROCEDURE — 92134 CPTRZ OPH DX IMG PST SGM RTA: CPT | Performed by: OPHTHALMOLOGY

## 2025-01-09 ASSESSMENT — CONFRONTATIONAL VISUAL FIELD TEST (CVF)
OS_FINDINGS: FULL
OD_FINDINGS: FULL

## 2025-01-09 ASSESSMENT — REFRACTION_CURRENTRX
OS_CYLINDER: -3.00
OS_OVR_VA: 20/
OD_AXIS: 007
OS_AXIS: 012
OS_SPHERE: -14.00
OD_SPHERE: -14.00
OD_CYLINDER: -3.50
OS_VPRISM_DIRECTION: SV
OD_VPRISM_DIRECTION: SV
OD_OVR_VA: 20/

## 2025-01-09 ASSESSMENT — LID EXAM ASSESSMENTS: OD_EDEMA: RUL 2+

## 2025-01-09 ASSESSMENT — REFRACTION_AUTOREFRACTION
OS_CYLINDER: -0.50
OS_AXIS: 010
OS_SPHERE: -8.00
OD_AXIS: 003
OD_SPHERE: -6.50
OD_CYLINDER: -1.00

## 2025-01-09 ASSESSMENT — TONOMETRY
OD_IOP_MMHG: 14
OS_IOP_MMHG: 16

## 2025-01-09 ASSESSMENT — KERATOMETRY
OS_K1POWER_DIOPTERS: 43.75
OS_AXISANGLE_DEGREES: 080
OD_K1POWER_DIOPTERS: 41.75
OD_AXISANGLE_DEGREES: 093
OS_K2POWER_DIOPTERS: 44.50
OD_K2POWER_DIOPTERS: 43.50

## 2025-01-09 ASSESSMENT — VISUAL ACUITY
OS_BCVA: 20/25-1
OD_BCVA: 20/80-1

## 2025-01-15 ENCOUNTER — OFFICE (OUTPATIENT)
Dept: URBAN - METROPOLITAN AREA CLINIC 1 | Facility: CLINIC | Age: 65
Setting detail: OPHTHALMOLOGY
End: 2025-01-15
Payer: COMMERCIAL

## 2025-01-15 DIAGNOSIS — Z96.1: ICD-10-CM

## 2025-01-15 PROCEDURE — 99024 POSTOP FOLLOW-UP VISIT: CPT

## 2025-01-15 ASSESSMENT — REFRACTION_AUTOREFRACTION
OS_SPHERE: -8.25
OD_AXIS: 003
OS_CYLINDER: -0.50
OD_CYLINDER: -2.00
OD_SPHERE: -6.00
OS_AXIS: 047

## 2025-01-15 ASSESSMENT — CONFRONTATIONAL VISUAL FIELD TEST (CVF)
OD_FINDINGS: FULL
OS_FINDINGS: FULL

## 2025-01-15 ASSESSMENT — REFRACTION_CURRENTRX
OS_CYLINDER: SPH
OS_ADD: +2.75
OS_SPHERE: -7.00
OD_AXIS: 010
OD_CYLINDER: -1.25
OS_VPRISM_DIRECTION: PROGS
OD_SPHERE: -5.75
OD_OVR_VA: 20/
OD_ADD: +2.75
OD_VPRISM_DIRECTION: PROGS
OS_OVR_VA: 20/

## 2025-01-15 ASSESSMENT — TONOMETRY
OS_IOP_MMHG: 17
OD_IOP_MMHG: 19

## 2025-01-15 ASSESSMENT — KERATOMETRY
OS_K1POWER_DIOPTERS: 43.75
OS_K2POWER_DIOPTERS: 44.75
OD_K2POWER_DIOPTERS: 44.00
OS_AXISANGLE_DEGREES: 064
OD_AXISANGLE_DEGREES: 098
OD_K1POWER_DIOPTERS: 42.00

## 2025-01-15 ASSESSMENT — VISUAL ACUITY
OS_BCVA: 20/25+1
OD_BCVA: 20/100

## 2025-01-15 ASSESSMENT — LID EXAM ASSESSMENTS: OD_EDEMA: RUL 2+

## 2025-02-07 DIAGNOSIS — Z00.00 ENCOUNTER FOR GENERAL ADULT MEDICAL EXAMINATION W/OUT ABNORMAL FINDINGS: ICD-10-CM

## 2025-03-05 ENCOUNTER — APPOINTMENT (OUTPATIENT)
Age: 65
End: 2025-03-05

## 2025-03-05 VITALS
HEIGHT: 72 IN | HEART RATE: 66 BPM | DIASTOLIC BLOOD PRESSURE: 80 MMHG | SYSTOLIC BLOOD PRESSURE: 122 MMHG | BODY MASS INDEX: 35.89 KG/M2 | WEIGHT: 265 LBS

## 2025-03-05 DIAGNOSIS — N18.4 HYPERTENSIVE CHRONIC KIDNEY DISEASE WITH STAGE 1 THROUGH STAGE 4 CHRONIC KIDNEY DISEASE, OR UNSPECIFIED CHRONIC KIDNEY DISEASE: ICD-10-CM

## 2025-03-05 DIAGNOSIS — I12.9 HYPERTENSIVE CHRONIC KIDNEY DISEASE WITH STAGE 1 THROUGH STAGE 4 CHRONIC KIDNEY DISEASE, OR UNSPECIFIED CHRONIC KIDNEY DISEASE: ICD-10-CM

## 2025-03-05 PROCEDURE — 99204 OFFICE O/P NEW MOD 45 MIN: CPT

## 2025-03-05 RX ORDER — DAPAGLIFLOZIN 5 MG/1
5 TABLET, FILM COATED ORAL
Refills: 0 | Status: ACTIVE | COMMUNITY

## 2025-04-18 ENCOUNTER — OFFICE (OUTPATIENT)
Dept: URBAN - METROPOLITAN AREA CLINIC 1 | Facility: CLINIC | Age: 65
Setting detail: OPHTHALMOLOGY
End: 2025-04-18
Payer: MEDICARE

## 2025-04-18 DIAGNOSIS — H35.372: ICD-10-CM

## 2025-04-18 DIAGNOSIS — H35.413: ICD-10-CM

## 2025-04-18 PROBLEM — H26.493 POSTERIOR CAPSULAR OPACIFICATION; BOTH EYES: Status: ACTIVE | Noted: 2025-04-18

## 2025-04-18 PROBLEM — H43.812 POSTERIOR VITREOUS DETACHMENT; LEFT EYE: Status: ACTIVE | Noted: 2025-04-18

## 2025-04-18 PROBLEM — H16.223 DRY EYE SYNDROME K SICCA; BOTH EYES: Status: ACTIVE | Noted: 2025-04-18

## 2025-04-18 PROCEDURE — 92250 FUNDUS PHOTOGRAPHY W/I&R: CPT

## 2025-04-18 PROCEDURE — 92014 COMPRE OPH EXAM EST PT 1/>: CPT

## 2025-04-18 ASSESSMENT — REFRACTION_AUTOREFRACTION
OD_SPHERE: -6.25
OS_AXIS: 001
OD_CYLINDER: -1.25
OS_CYLINDER: -0.75
OD_AXIS: 005
OS_SPHERE: -8.00

## 2025-04-18 ASSESSMENT — REFRACTION_CURRENTRX
OS_VPRISM_DIRECTION: PROGS
OS_ADD: +2.75
OD_SPHERE: -5.75
OD_OVR_VA: 20/
OS_CYLINDER: SPH
OD_AXIS: 010
OD_ADD: +2.75
OD_CYLINDER: -1.25
OS_OVR_VA: 20/
OD_VPRISM_DIRECTION: PROGS
OS_SPHERE: -7.00

## 2025-04-18 ASSESSMENT — KERATOMETRY
OD_K2POWER_DIOPTERS: 44.25
OD_AXISANGLE_DEGREES: 094
OS_K2POWER_DIOPTERS: 44.75
OS_K1POWER_DIOPTERS: 42.25
OD_K1POWER_DIOPTERS: 42.50
OS_AXISANGLE_DEGREES: 067

## 2025-04-18 ASSESSMENT — VISUAL ACUITY
OS_BCVA: 20/30-1
OD_BCVA: 20/70-1

## 2025-04-18 ASSESSMENT — TONOMETRY
OD_IOP_MMHG: 16
OS_IOP_MMHG: 18

## 2025-04-18 ASSESSMENT — SUPERFICIAL PUNCTATE KERATITIS (SPK)
OD_SPK: T
OS_SPK: T

## 2025-04-18 ASSESSMENT — CONFRONTATIONAL VISUAL FIELD TEST (CVF)
OD_FINDINGS: FULL
OS_FINDINGS: FULL

## 2025-04-22 ENCOUNTER — APPOINTMENT (OUTPATIENT)
Age: 65
End: 2025-04-22
Payer: MEDICARE

## 2025-04-22 VITALS
WEIGHT: 266 LBS | SYSTOLIC BLOOD PRESSURE: 123 MMHG | BODY MASS INDEX: 36.03 KG/M2 | HEIGHT: 72 IN | DIASTOLIC BLOOD PRESSURE: 85 MMHG | HEART RATE: 65 BPM

## 2025-04-22 DIAGNOSIS — R80.0 ISOLATED PROTEINURIA: ICD-10-CM

## 2025-04-22 DIAGNOSIS — R80.9 PROTEINURIA, UNSPECIFIED: ICD-10-CM

## 2025-04-22 DIAGNOSIS — N18.4 CHRONIC KIDNEY DISEASE, STAGE 4 (SEVERE): ICD-10-CM

## 2025-04-22 DIAGNOSIS — N18.4 HYPERTENSIVE CHRONIC KIDNEY DISEASE WITH STAGE 1 THROUGH STAGE 4 CHRONIC KIDNEY DISEASE, OR UNSPECIFIED CHRONIC KIDNEY DISEASE: ICD-10-CM

## 2025-04-22 DIAGNOSIS — I12.9 HYPERTENSIVE CHRONIC KIDNEY DISEASE WITH STAGE 1 THROUGH STAGE 4 CHRONIC KIDNEY DISEASE, OR UNSPECIFIED CHRONIC KIDNEY DISEASE: ICD-10-CM

## 2025-04-22 PROCEDURE — 99214 OFFICE O/P EST MOD 30 MIN: CPT

## 2025-04-24 RX ORDER — SEMAGLUTIDE 2.4 MG/.75ML
2.4 INJECTION, SOLUTION SUBCUTANEOUS
Qty: 3 | Refills: 0 | Status: ACTIVE | COMMUNITY
Start: 2025-01-30

## 2025-04-24 RX ORDER — CALCITRIOL 0.5 UG/1
0.5 CAPSULE, LIQUID FILLED ORAL
Qty: 90 | Refills: 0 | Status: ACTIVE | COMMUNITY
Start: 2024-05-20

## 2025-04-24 RX ORDER — TIMOLOL MALEATE 5 MG/ML
0.5 SOLUTION OPHTHALMIC
Qty: 10 | Refills: 0 | Status: ACTIVE | COMMUNITY
Start: 2024-11-08

## 2025-04-24 RX ORDER — BROMFENAC 1.03 MG/ML
0.09 SOLUTION/ DROPS OPHTHALMIC
Qty: 2 | Refills: 0 | Status: ACTIVE | COMMUNITY
Start: 2024-10-24

## 2025-06-24 ENCOUNTER — NON-APPOINTMENT (OUTPATIENT)
Age: 65
End: 2025-06-24

## 2025-06-24 ENCOUNTER — APPOINTMENT (OUTPATIENT)
Dept: CARDIOLOGY | Facility: CLINIC | Age: 65
End: 2025-06-24
Payer: MEDICARE

## 2025-06-24 VITALS
WEIGHT: 263 LBS | HEART RATE: 65 BPM | HEIGHT: 72 IN | BODY MASS INDEX: 35.62 KG/M2 | OXYGEN SATURATION: 98 % | DIASTOLIC BLOOD PRESSURE: 79 MMHG | SYSTOLIC BLOOD PRESSURE: 117 MMHG

## 2025-06-24 PROBLEM — I26.99 PULMONARY EMBOLISM, BILATERAL: Status: ACTIVE | Noted: 2025-06-24

## 2025-06-24 PROCEDURE — G2211 COMPLEX E/M VISIT ADD ON: CPT

## 2025-06-24 PROCEDURE — 93000 ELECTROCARDIOGRAM COMPLETE: CPT

## 2025-06-24 PROCEDURE — 99204 OFFICE O/P NEW MOD 45 MIN: CPT

## 2025-06-26 ENCOUNTER — OFFICE (OUTPATIENT)
Dept: URBAN - METROPOLITAN AREA CLINIC 115 | Facility: CLINIC | Age: 65
Setting detail: OPHTHALMOLOGY
End: 2025-06-26
Payer: MEDICARE

## 2025-06-26 DIAGNOSIS — H44.22: ICD-10-CM

## 2025-06-26 DIAGNOSIS — H43.812: ICD-10-CM

## 2025-06-26 DIAGNOSIS — H35.413: ICD-10-CM

## 2025-06-26 PROCEDURE — 92014 COMPRE OPH EXAM EST PT 1/>: CPT | Performed by: OPHTHALMOLOGY

## 2025-06-26 PROCEDURE — 92134 CPTRZ OPH DX IMG PST SGM RTA: CPT | Performed by: OPHTHALMOLOGY

## 2025-06-26 ASSESSMENT — KERATOMETRY
OD_AXISANGLE_DEGREES: 094
OD_K2POWER_DIOPTERS: 44.25
OS_K1POWER_DIOPTERS: 42.25
OD_K1POWER_DIOPTERS: 42.50
OS_AXISANGLE_DEGREES: 067
OS_K2POWER_DIOPTERS: 44.75

## 2025-06-26 ASSESSMENT — REFRACTION_CURRENTRX
OS_CYLINDER: SPH
OD_ADD: +2.75
OS_OVR_VA: 20/
OD_OVR_VA: 20/
OD_SPHERE: -5.75
OD_AXIS: 010
OS_VPRISM_DIRECTION: PROGS
OS_ADD: +2.75
OS_SPHERE: -7.00
OD_VPRISM_DIRECTION: PROGS
OD_CYLINDER: -1.25

## 2025-06-26 ASSESSMENT — SUPERFICIAL PUNCTATE KERATITIS (SPK)
OD_SPK: T
OS_SPK: T

## 2025-06-26 ASSESSMENT — TONOMETRY
OS_IOP_MMHG: 14
OD_IOP_MMHG: 15

## 2025-06-26 ASSESSMENT — REFRACTION_AUTOREFRACTION
OD_SPHERE: -6.25
OD_AXIS: 005
OD_CYLINDER: -1.25
OS_SPHERE: -8.00
OS_CYLINDER: -0.75
OS_AXIS: 001

## 2025-06-26 ASSESSMENT — VISUAL ACUITY
OD_BCVA: 20/100-1
OS_BCVA: 20/30-1

## 2025-06-26 ASSESSMENT — CONFRONTATIONAL VISUAL FIELD TEST (CVF)
OS_FINDINGS: FULL
OD_FINDINGS: FULL

## 2025-08-26 ENCOUNTER — APPOINTMENT (OUTPATIENT)
Age: 65
End: 2025-08-26
Payer: MEDICARE

## 2025-08-26 VITALS
HEIGHT: 72 IN | BODY MASS INDEX: 35.21 KG/M2 | WEIGHT: 260 LBS | HEART RATE: 60 BPM | DIASTOLIC BLOOD PRESSURE: 89 MMHG | SYSTOLIC BLOOD PRESSURE: 144 MMHG

## 2025-08-26 DIAGNOSIS — R80.0 ISOLATED PROTEINURIA: ICD-10-CM

## 2025-08-26 DIAGNOSIS — I10 ESSENTIAL (PRIMARY) HYPERTENSION: ICD-10-CM

## 2025-08-26 DIAGNOSIS — N18.4 CHRONIC KIDNEY DISEASE, STAGE 4 (SEVERE): ICD-10-CM

## 2025-08-26 DIAGNOSIS — M17.0 BILATERAL PRIMARY OSTEOARTHRITIS OF KNEE: ICD-10-CM

## 2025-08-26 DIAGNOSIS — R80.9 PROTEINURIA, UNSPECIFIED: ICD-10-CM

## 2025-08-26 PROCEDURE — 99214 OFFICE O/P EST MOD 30 MIN: CPT

## 2025-08-29 PROBLEM — I10 BENIGN ESSENTIAL HTN: Status: ACTIVE | Noted: 2025-06-24

## 2025-08-29 RX ORDER — OXYCODONE AND ACETAMINOPHEN 10; 325 MG/1; MG/1
10-325 TABLET ORAL
Qty: 20 | Refills: 0 | Status: ACTIVE | COMMUNITY
Start: 2025-06-27